# Patient Record
Sex: FEMALE | Race: ASIAN | NOT HISPANIC OR LATINO | URBAN - METROPOLITAN AREA
[De-identification: names, ages, dates, MRNs, and addresses within clinical notes are randomized per-mention and may not be internally consistent; named-entity substitution may affect disease eponyms.]

---

## 2018-11-08 ENCOUNTER — INPATIENT (INPATIENT)
Facility: HOSPITAL | Age: 53
LOS: 0 days | Discharge: HOME | End: 2018-11-09
Attending: INTERNAL MEDICINE | Admitting: INTERNAL MEDICINE
Payer: MEDICAID

## 2018-11-08 VITALS
SYSTOLIC BLOOD PRESSURE: 121 MMHG | HEIGHT: 64 IN | RESPIRATION RATE: 18 BRPM | WEIGHT: 132.5 LBS | DIASTOLIC BLOOD PRESSURE: 76 MMHG | TEMPERATURE: 98 F | HEART RATE: 65 BPM | OXYGEN SATURATION: 100 %

## 2018-11-08 DIAGNOSIS — Z90.49 ACQUIRED ABSENCE OF OTHER SPECIFIED PARTS OF DIGESTIVE TRACT: Chronic | ICD-10-CM

## 2018-11-08 DIAGNOSIS — Z98.890 OTHER SPECIFIED POSTPROCEDURAL STATES: Chronic | ICD-10-CM

## 2018-11-08 LAB
BUN SERPL-MCNC: 11 MG/DL — SIGNIFICANT CHANGE UP (ref 7–23)
CALCIUM SERPL-MCNC: 9.3 MG/DL — SIGNIFICANT CHANGE UP (ref 8.4–10.5)
CHLORIDE SERPL-SCNC: 105 MMOL/L — SIGNIFICANT CHANGE UP (ref 98–107)
CO2 SERPL-SCNC: 22 MMOL/L — SIGNIFICANT CHANGE UP (ref 22–31)
CREAT SERPL-MCNC: 0.56 MG/DL — SIGNIFICANT CHANGE UP (ref 0.5–1.3)
GLUCOSE SERPL-MCNC: 93 MG/DL — SIGNIFICANT CHANGE UP (ref 70–99)
HBA1C BLD-MCNC: 5.6 % — SIGNIFICANT CHANGE UP (ref 4–5.6)
HCG UR QL: NEGATIVE — SIGNIFICANT CHANGE UP
HCT VFR BLD CALC: 36.9 % — SIGNIFICANT CHANGE UP (ref 34.5–45)
HGB BLD-MCNC: 11.8 G/DL — SIGNIFICANT CHANGE UP (ref 11.5–15.5)
MCHC RBC-ENTMCNC: 28.6 PG — SIGNIFICANT CHANGE UP (ref 27–34)
MCHC RBC-ENTMCNC: 32 % — SIGNIFICANT CHANGE UP (ref 32–36)
MCV RBC AUTO: 89.6 FL — SIGNIFICANT CHANGE UP (ref 80–100)
NRBC # FLD: 0 — SIGNIFICANT CHANGE UP
PLATELET # BLD AUTO: 142 K/UL — LOW (ref 150–400)
PMV BLD: 12 FL — SIGNIFICANT CHANGE UP (ref 7–13)
POTASSIUM SERPL-MCNC: 3.6 MMOL/L — SIGNIFICANT CHANGE UP (ref 3.5–5.3)
POTASSIUM SERPL-MCNC: SIGNIFICANT CHANGE UP MMOL/L (ref 3.5–5.3)
POTASSIUM SERPL-SCNC: 3.6 MMOL/L — SIGNIFICANT CHANGE UP (ref 3.5–5.3)
POTASSIUM SERPL-SCNC: SIGNIFICANT CHANGE UP MMOL/L (ref 3.5–5.3)
RBC # BLD: 4.12 M/UL — SIGNIFICANT CHANGE UP (ref 3.8–5.2)
RBC # FLD: 12.1 % — SIGNIFICANT CHANGE UP (ref 10.3–14.5)
SODIUM SERPL-SCNC: 138 MMOL/L — SIGNIFICANT CHANGE UP (ref 135–145)
WBC # BLD: 5.02 K/UL — SIGNIFICANT CHANGE UP (ref 3.8–10.5)
WBC # FLD AUTO: 5.02 K/UL — SIGNIFICANT CHANGE UP (ref 3.8–10.5)

## 2018-11-08 PROCEDURE — 92928 PRQ TCAT PLMT NTRAC ST 1 LES: CPT | Mod: LD

## 2018-11-08 PROCEDURE — 93458 L HRT ARTERY/VENTRICLE ANGIO: CPT | Mod: 26,59

## 2018-11-08 PROCEDURE — 93010 ELECTROCARDIOGRAM REPORT: CPT

## 2018-11-08 RX ORDER — CLOPIDOGREL BISULFATE 75 MG/1
75 TABLET, FILM COATED ORAL DAILY
Qty: 0 | Refills: 0 | Status: DISCONTINUED | OUTPATIENT
Start: 2018-11-09 | End: 2018-11-09

## 2018-11-08 RX ORDER — ATORVASTATIN CALCIUM 80 MG/1
40 TABLET, FILM COATED ORAL AT BEDTIME
Qty: 0 | Refills: 0 | Status: DISCONTINUED | OUTPATIENT
Start: 2018-11-08 | End: 2018-11-09

## 2018-11-08 RX ORDER — METOPROLOL TARTRATE 50 MG
25 TABLET ORAL DAILY
Qty: 0 | Refills: 0 | Status: DISCONTINUED | OUTPATIENT
Start: 2018-11-08 | End: 2018-11-08

## 2018-11-08 RX ORDER — OMEPRAZOLE 10 MG/1
1 CAPSULE, DELAYED RELEASE ORAL
Qty: 0 | Refills: 0 | COMMUNITY

## 2018-11-08 RX ORDER — ASPIRIN/CALCIUM CARB/MAGNESIUM 324 MG
325 TABLET ORAL DAILY
Qty: 0 | Refills: 0 | Status: DISCONTINUED | OUTPATIENT
Start: 2018-11-09 | End: 2018-11-09

## 2018-11-08 RX ORDER — PANTOPRAZOLE SODIUM 20 MG/1
40 TABLET, DELAYED RELEASE ORAL
Qty: 0 | Refills: 0 | Status: DISCONTINUED | OUTPATIENT
Start: 2018-11-08 | End: 2018-11-09

## 2018-11-08 RX ORDER — SODIUM CHLORIDE 9 MG/ML
3 INJECTION INTRAMUSCULAR; INTRAVENOUS; SUBCUTANEOUS EVERY 8 HOURS
Qty: 0 | Refills: 0 | Status: DISCONTINUED | OUTPATIENT
Start: 2018-11-08 | End: 2018-11-09

## 2018-11-08 RX ORDER — TAMOXIFEN CITRATE 20 MG/1
20 TABLET, FILM COATED ORAL DAILY
Qty: 0 | Refills: 0 | Status: DISCONTINUED | OUTPATIENT
Start: 2018-11-08 | End: 2018-11-09

## 2018-11-08 RX ORDER — TAMOXIFEN CITRATE 20 MG/1
1 TABLET, FILM COATED ORAL
Qty: 0 | Refills: 0 | COMMUNITY

## 2018-11-08 RX ADMIN — SODIUM CHLORIDE 3 MILLILITER(S): 9 INJECTION INTRAMUSCULAR; INTRAVENOUS; SUBCUTANEOUS at 21:11

## 2018-11-08 RX ADMIN — ATORVASTATIN CALCIUM 40 MILLIGRAM(S): 80 TABLET, FILM COATED ORAL at 21:14

## 2018-11-08 NOTE — H&P CARDIOLOGY - HISTORY OF PRESENT ILLNESS
52 year old woman with PMHx of GERD, borderline HLD not on any meds, Breast CA s/p lumpectomy (01/2018) and radiation sent to [facility] from cardiologist office after having an abnormal stress test. Patient states that she has been having exertional CP with associated SOB and lightheadedness that has been persistent for approximately 2 weeks. She states that pain is upon exertion and improves with rest. She went to see her primary care doctor who believed she was having symptoms of GERD and started her on omeprazole. When she followed up with her cardiologist today he performed a stress test and told her that the results were abnormal and referred her coronary angiogram. Patient states that she has a strong family history of CAD her mother had CABG at age 36. Patient is sitting in bed denies any chest pain, SOB, HA, dizziness, lightheadedness, N/V/D/C, dysuria, changes in bowel or bladder habits or recent illness.

## 2018-11-08 NOTE — H&P CARDIOLOGY - RS GEN PE MLT RESP DETAILS PC
good air movement/clear to auscultation bilaterally/breath sounds equal/airway patent/respirations non-labored/normal

## 2018-11-08 NOTE — H&P CARDIOLOGY - PMH
Borderline hyperlipidemia    Breast cancer  s/p lumpectomy (01/18) and radiation therapy  GERD (gastroesophageal reflux disease)

## 2018-11-08 NOTE — CHART NOTE - NSCHARTNOTEFT_GEN_A_CORE
Rt femoral 6 Fr arterial sheath discontinued pressure applied for 30 mins and good hemostatis obtained. No hematoma or bleeding noted. Vital sign stable .Patient tolerated procedure well. RN instructed to monitor groin for bleeding or hematoma  .

## 2018-11-08 NOTE — H&P CARDIOLOGY - FAMILY HISTORY
Mother  Still living? Yes, Estimated age: Age Unknown  Family history of CABG, Age at diagnosis: Age Unknown  Family history of diabetes mellitus, Age at diagnosis: Age Unknown  Family history of stroke, Age at diagnosis: Age Unknown

## 2018-11-09 VITALS
SYSTOLIC BLOOD PRESSURE: 107 MMHG | RESPIRATION RATE: 18 BRPM | TEMPERATURE: 98 F | DIASTOLIC BLOOD PRESSURE: 71 MMHG | HEART RATE: 72 BPM | OXYGEN SATURATION: 99 %

## 2018-11-09 LAB
BUN SERPL-MCNC: 14 MG/DL — SIGNIFICANT CHANGE UP (ref 7–23)
CALCIUM SERPL-MCNC: 9.1 MG/DL — SIGNIFICANT CHANGE UP (ref 8.4–10.5)
CHLORIDE SERPL-SCNC: 106 MMOL/L — SIGNIFICANT CHANGE UP (ref 98–107)
CO2 SERPL-SCNC: 24 MMOL/L — SIGNIFICANT CHANGE UP (ref 22–31)
CREAT SERPL-MCNC: 0.64 MG/DL — SIGNIFICANT CHANGE UP (ref 0.5–1.3)
GLUCOSE SERPL-MCNC: 100 MG/DL — HIGH (ref 70–99)
HCT VFR BLD CALC: 37.3 % — SIGNIFICANT CHANGE UP (ref 34.5–45)
HGB BLD-MCNC: 11.8 G/DL — SIGNIFICANT CHANGE UP (ref 11.5–15.5)
MAGNESIUM SERPL-MCNC: 2.1 MG/DL — SIGNIFICANT CHANGE UP (ref 1.6–2.6)
MCHC RBC-ENTMCNC: 28.5 PG — SIGNIFICANT CHANGE UP (ref 27–34)
MCHC RBC-ENTMCNC: 31.6 % — LOW (ref 32–36)
MCV RBC AUTO: 90.1 FL — SIGNIFICANT CHANGE UP (ref 80–100)
NRBC # FLD: 0 — SIGNIFICANT CHANGE UP
PLATELET # BLD AUTO: 142 K/UL — LOW (ref 150–400)
PMV BLD: 11.3 FL — SIGNIFICANT CHANGE UP (ref 7–13)
POTASSIUM SERPL-MCNC: 4.6 MMOL/L — SIGNIFICANT CHANGE UP (ref 3.5–5.3)
POTASSIUM SERPL-SCNC: 4.6 MMOL/L — SIGNIFICANT CHANGE UP (ref 3.5–5.3)
RBC # BLD: 4.14 M/UL — SIGNIFICANT CHANGE UP (ref 3.8–5.2)
RBC # FLD: 12.2 % — SIGNIFICANT CHANGE UP (ref 10.3–14.5)
SODIUM SERPL-SCNC: 140 MMOL/L — SIGNIFICANT CHANGE UP (ref 135–145)
WBC # BLD: 5.07 K/UL — SIGNIFICANT CHANGE UP (ref 3.8–10.5)
WBC # FLD AUTO: 5.07 K/UL — SIGNIFICANT CHANGE UP (ref 3.8–10.5)

## 2018-11-09 RX ORDER — ASPIRIN/CALCIUM CARB/MAGNESIUM 324 MG
1 TABLET ORAL
Qty: 30 | Refills: 0 | OUTPATIENT
Start: 2018-11-09 | End: 2018-12-08

## 2018-11-09 RX ORDER — CLOPIDOGREL BISULFATE 75 MG/1
1 TABLET, FILM COATED ORAL
Qty: 0 | Refills: 0 | COMMUNITY
Start: 2018-11-09

## 2018-11-09 RX ORDER — ACETAMINOPHEN 500 MG
650 TABLET ORAL EVERY 6 HOURS
Qty: 0 | Refills: 0 | Status: DISCONTINUED | OUTPATIENT
Start: 2018-11-09 | End: 2018-11-09

## 2018-11-09 RX ORDER — CLOPIDOGREL BISULFATE 75 MG/1
1 TABLET, FILM COATED ORAL
Qty: 30 | Refills: 0 | OUTPATIENT
Start: 2018-11-09 | End: 2018-12-08

## 2018-11-09 RX ORDER — ATORVASTATIN CALCIUM 80 MG/1
1 TABLET, FILM COATED ORAL
Qty: 0 | Refills: 0 | COMMUNITY
Start: 2018-11-09

## 2018-11-09 RX ORDER — ATORVASTATIN CALCIUM 80 MG/1
1 TABLET, FILM COATED ORAL
Qty: 30 | Refills: 0 | OUTPATIENT
Start: 2018-11-09 | End: 2018-12-08

## 2018-11-09 RX ORDER — ASPIRIN/CALCIUM CARB/MAGNESIUM 324 MG
1 TABLET ORAL
Qty: 0 | Refills: 0 | COMMUNITY
Start: 2018-11-09

## 2018-11-09 RX ADMIN — PANTOPRAZOLE SODIUM 40 MILLIGRAM(S): 20 TABLET, DELAYED RELEASE ORAL at 05:50

## 2018-11-09 RX ADMIN — SODIUM CHLORIDE 3 MILLILITER(S): 9 INJECTION INTRAMUSCULAR; INTRAVENOUS; SUBCUTANEOUS at 05:50

## 2018-11-09 RX ADMIN — Medication 325 MILLIGRAM(S): at 12:55

## 2018-11-09 RX ADMIN — CLOPIDOGREL BISULFATE 75 MILLIGRAM(S): 75 TABLET, FILM COATED ORAL at 12:55

## 2018-11-09 RX ADMIN — SODIUM CHLORIDE 3 MILLILITER(S): 9 INJECTION INTRAMUSCULAR; INTRAVENOUS; SUBCUTANEOUS at 13:16

## 2018-11-09 RX ADMIN — TAMOXIFEN CITRATE 20 MILLIGRAM(S): 20 TABLET, FILM COATED ORAL at 12:55

## 2018-11-09 NOTE — DISCHARGE NOTE ADULT - PATIENT PORTAL LINK FT
You can access the [facility] Patient Portal, offered by [facility], by registering with the following website: [email or web address]

## 2018-11-09 NOTE — DISCHARGE NOTE ADULT - CARE PLAN
Principal Discharge DX:	CAD (coronary artery disease)  Goal:	To be asymptomatic, to reduce risks factors such as hypertension, diabetes and hyperlipidemia to lower the risk of blood clots formation; and to prevent complications of coronary artery disease such as worsening chest pain, heart attack and death.  Assessment and plan of treatment:	Continue aspirin and Plavix, do not stop unless instructed by your physician.  Continue low salt, fat, cholesterol and carbohydrate diet. Follow up with cardiologist and primary care physician's routine appointment. Principal Discharge DX:	CAD (coronary artery disease)  Goal:	To be asymptomatic, to reduce risks factors such as hypertension, diabetes and hyperlipidemia to lower the risk of blood clots formation; and to prevent complications of coronary artery disease such as worsening chest pain, heart attack and death.  Assessment and plan of treatment:	Continue aspirin and Plavix, do not stop unless instructed by your physician.  Continue low salt, fat, cholesterol and carbohydrate diet. Follow up with cardiologist and primary care physician's routine appointment.  Secondary Diagnosis:	Breast cancer  Goal:	Continue Tamoxifen  Assessment and plan of treatment:	Follow up with your primary care doctor  Secondary Diagnosis:	GERD (gastroesophageal reflux disease)  Goal:	To prevent acid reflux, heartburn and chest pain.  Assessment and plan of treatment:	Avoid fatty, fried foods, acidic foods such as tomatoes, lime and chocolate. Continue to take your medications as prescribed, exercise daily and weight loss.  Secondary Diagnosis:	Hyperlipidemia  Goal:	To maintain normal cholesterol levels to prevent stroke, coronary artery disease, peripheral vascular disease and heart attacks.  Assessment and plan of treatment:	Low fat diet, exercise daily and continue current medications. Follow up with primary care physician and cardiologist for management.

## 2018-11-09 NOTE — DISCHARGE NOTE ADULT - PLAN OF CARE
To be asymptomatic, to reduce risks factors such as hypertension, diabetes and hyperlipidemia to lower the risk of blood clots formation; and to prevent complications of coronary artery disease such as worsening chest pain, heart attack and death. Continue aspirin and Plavix, do not stop unless instructed by your physician.  Continue low salt, fat, cholesterol and carbohydrate diet. Follow up with cardiologist and primary care physician's routine appointment. Continue Tamoxifen Follow up with your primary care doctor To prevent acid reflux, heartburn and chest pain. Avoid fatty, fried foods, acidic foods such as tomatoes, lime and chocolate. Continue to take your medications as prescribed, exercise daily and weight loss. To maintain normal cholesterol levels to prevent stroke, coronary artery disease, peripheral vascular disease and heart attacks. Low fat diet, exercise daily and continue current medications. Follow up with primary care physician and cardiologist for management.

## 2018-11-09 NOTE — DISCHARGE NOTE ADULT - PROVIDER TOKENS
PILI:'[id number]' TOKEN:'[id number]',FREE:[LAST:[Dr. Ivory Deutsch],PHONE:[phone]],FAX:[(   )    -],ADDRESS:[Internist  [address] [place] [address]]],TOKEN:'[id number]'

## 2018-11-09 NOTE — PROGRESS NOTE ADULT - ASSESSMENT
52 year old woman with PMHx of GERD, borderline HLD not on any meds, Breast CA s/p lumpectomy (01/2018) and radiation sent to [facility] from cardiologist office after having an abnormal stress test s/p PCI mLAD.    CAD s/p PCI mLAD  - ASA 324mg daily  - Plavix 75mg daily  - Atoravastatin 40mg qhs  - Follow-up with primary cardiology Dr. Pritesh Rocha in 1-2 weeks    Tyree Ramos M.D.  Cardiology Fellow

## 2018-11-09 NOTE — DISCHARGE NOTE ADULT - CARE PROVIDER_API CALL
Isaac Jha), Cardiovascular Disease; Internal Medicine; Interventional Cardiology  [address] [address] [place] [address]  [place] [address]  Phone: [phone]  Fax: [phone] Isaac Jha), Cardiovascular Disease; Internal Medicine; Interventional Cardiology  [address] [address] [place] [address]  [address] [place] [address]  Phone: [phone]  Fax: [phone]    Dr. Ivory Deutsch,   Internist  [address] [place] [address]  Phone: [phone]  Fax: (   )    -    Pritesh Montenegro), Cardiovascular Disease; Internal Medicine  Phone: [phone]  Fax: [phone]

## 2018-11-09 NOTE — PROGRESS NOTE ADULT - SUBJECTIVE AND OBJECTIVE BOX
CARDIOLOGY PROGRESS NOTE    Interval Events:   Patient seen and examined. s/p [facility]. Denies chest pain, palpitations, SOB.    Review Of Systems:   All other review of systems negative unless indicated above.         Medications:  acetaminophen   Tablet .. 650 milliGRAM(s) Oral every 6 hours PRN  aspirin enteric coated 325 milliGRAM(s) Oral daily  atorvastatin 40 milliGRAM(s) Oral at bedtime  clopidogrel Tablet 75 milliGRAM(s) Oral daily  pantoprazole    Tablet 40 milliGRAM(s) Oral before breakfast  sodium chloride 0.9% lock flush 3 milliLiter(s) IV Push every 8 hours  tamoxifen 20 milliGRAM(s) Oral daily    PAST MEDICAL & SURGICAL HISTORY:  GERD (gastroesophageal reflux disease)  Breast cancer: s/p lumpectomy (01/18) and radiation therapy  Borderline hyperlipidemia  History of appendectomy  History of lumpectomy      Vitals:  T(F): 97.8 (11-09), Max: 98 (11-08)  HR: 67 (11-09) (55 - 67)  BP: 105/72 (11-09) (96/65 - 121/76)  RR: 18 (11-09)  SpO2: 99% (11-09)  I&O's Summary      Physical Exam:  Appearance: No acute distress; well appearing  Eyes: PERRL, EOMI, pink conjunctiva  HEENT: Normal oral mucosa  Cardiovascular: RRR, S1, S2, no murmurs, rubs, or gallops; no edema; no JVD  Respiratory: Clear to auscultation bilaterally  Gastrointestinal: soft, non-tender, non-distended with normal bowel sounds  Musculoskeletal: No clubbing; no joint deformity   Neurologic: Non-focal  Psychiatry: AAOx3, mood & affect appropriate  Skin: No rashes, ecchymoses, or cyanosis    Labs:                        11.8   5.07  )-----------( 142      ( 09 Nov 2018 07:50 )             37.3     11-09    140  |  106  |  14  ----------------------------<  100<H>  4.6   |  24  |  0.64    Ca    9.1      09 Nov 2018 07:50  Mg     2.1     11-09                Hemoglobin A1C, Whole Blood: 5.6 % (11-08 @ 15:00)      New ECG(s): Personally reviewed    Echo:    Stress Testing:     Cath:  < from: Cardiac Cath Lab - Adult (11.08.18 @ 17:15) >  VENTRICLES: Global left ventricular function was normal. EF estimated was  55 %.  CORONARY VESSELS: The coronary circulation is right dominant.  LM:   --  LM: Normal.  LAD:   --  Mid LAD: There was a discrete 90 % stenosis in the proximal  third of the vessel segment. There was DAVID grade 3 flow through the  vessel (brisk flow).  CX:   --  Circumflex: Normal.  RCA:   --  Proximal RCA: There was a discrete 20 % stenosis.  COMPLICATIONS: There were no complications.  SUMMARY:  1ST LESION INTERVENTIONS: A successful balloon angioplasty with stent was  performed on the 90 % lesion in the mid LAD. Following intervention there  was a 1 % residual stenosis.  DIAGNOSTIC RECOMMENDATIONS: PCI of mid LAD for treatment of angina.  INTERVENTIONAL RECOMMENDATIONS: Add clopidogrel (Plavix), 75 mg, PO, daily.  Add aspirin, 325 mg, PO, daily. Patient management should include    Imaging:

## 2018-11-09 NOTE — DISCHARGE NOTE ADULT - FINDINGS/TREATMENT
VENTRICLES: Global left ventricular function was normal. EF estimated was  55 %.  CORONARY VESSELS: The coronary circulation is right dominant.  LM:   --  LM: Normal.  LAD:   --  Mid LAD: There was a discrete 90 % stenosis in the proximal  third of the vessel segment. There was DAVID grade 3 flow through the  vessel (brisk flow).  CX:   --  Circumflex: Normal.  RCA:   --  Proximal RCA: There was a discrete 20 % stenosis.

## 2018-11-09 NOTE — PROGRESS NOTE ADULT - PROVIDER SPECIALTY LIST ADULT
Cardiology Patient needs refill on     Droxidopa 100 MG Cap    [facility] [phone] option 2 then option 4    FAX: [phone]

## 2018-11-09 NOTE — DISCHARGE NOTE ADULT - MEDICATION SUMMARY - MEDICATIONS TO TAKE
I will START or STAY ON the medications listed below when I get home from the hospital:    aspirin 325 mg oral delayed release tablet  -- 1 tab(s) by mouth once a day  -- Indication: For CAD (coronary artery disease)    atorvastatin 40 mg oral tablet  -- 1 tab(s) by mouth once a day (at bedtime)  -- Indication: For Hyperlipidemia    tamoxifen 20 mg oral tablet  -- 1 tab(s) by mouth once a day  -- Indication: For Breast cancer    clopidogrel 75 mg oral tablet  -- 1 tab(s) by mouth once a day  -- Indication: For CAD (coronary artery disease)    omeprazole 20 mg oral delayed release capsule  -- 1 cap(s) by mouth once a day  -- Indication: For GERD (gastroesophageal reflux disease) I will START or STAY ON the medications listed below when I get home from the hospital:    aspirin 325 mg oral delayed release tablet  -- 1 tab(s) by mouth once a day  -- Indication: For CAD (coronary artery disease)    atorvastatin 40 mg oral tablet  -- 1 tab(s) by mouth once a day (at bedtime)  -- Indication: For CAD (coronary artery disease)    tamoxifen 20 mg oral tablet  -- 1 tab(s) by mouth once a day  -- Indication: For Breast cancer    clopidogrel 75 mg oral tablet  -- 1 tab(s) by mouth once a day  -- Indication: For CAD (coronary artery disease)    omeprazole 20 mg oral delayed release capsule  -- 1 cap(s) by mouth once a day  -- Indication: For GERD (gastroesophageal reflux disease)

## 2018-11-09 NOTE — DISCHARGE NOTE ADULT - CARE PROVIDERS DIRECT ADDRESSES
[email or web address],DirectAddress_Unknown,DirectAddress_Unknown

## 2018-11-09 NOTE — DISCHARGE NOTE ADULT - HOSPITAL COURSE
52 year old woman with PMHx of GERD, borderline HLD not on any meds, Breast CA s/p lumpectomy (01/2018) and radiation sent to [facility] from cardiologist office after having an abnormal stress test. Patient states that she has been having exertional CP with associated SOB and lightheadedness that has been persistent for approximately 2 weeks. She states that pain is upon exertion and improves with rest. She went to see her primary care doctor who believed she was having symptoms of GERD and started her on omeprazole. When she followed up with her cardiologist today he performed a stress test and told her that the results were abnormal and referred her coronary angiogram. Patient states that she has a strong family history of CAD her mother had CABG at age 36. Patient is sitting in bed denies any chest pain, SOB, HA, dizziness, lightheadedness, N/V/D/C, dysuria, changes in bowel or bladder habits or recent illness.    Patient had a cardiac catheterization that resulted  in a successful balloon angioplasty with stent was performed on the 90 % lesion in the mid LAD for treatment of angina.  Recommendations made to add Plavix 75 mg PO daily, add aspirin 325 mg PO daily. Patient management should include aggressive medical therapy.    < end of copied text > 53 y/o female, with PmHx of GERD, HLD, Breast CA s/p lumpectomy (01/2018) and radiation sent to [facility] from cardiologist office after having an abnormal stress test. Patient states that she has been having exertional CP with associated SOB and lightheadedness that has been persistent for approximately 2 weeks. She states that the pain is upon exertion and improves with rest. She went to see her primary care doctor who believed she was having symptoms of GERD and started her on omeprazole. When she followed up with her cardiologist today he performed a stress test and told her that the results were abnormal and referred her coronary angiogram. Patient states that she has a strong family history of CAD her mother had CABG at age 36. Patient is sitting in bed denies any chest pain, SOB, HA, dizziness, lightheadedness, N/V/D/C, dysuria, changes in bowel or bladder habits or recent illness. She was [facility] a cardiac cath.    Patient had a cardiac catheterization that resulted  in a successful balloon angioplasty and a stent was placed to the 90 % lesion in the mid LAD for treatment of angina.  Recommendations made to add Plavix 75 mg PO daily, add aspirin 325 mg PO daily. Patient management should include aggressive medical therapy.    Pt comfortable at this time and is now medically cleared for discharge home. Outpatient follow up.

## 2022-09-08 NOTE — PATIENT PROFILE ADULT - EQUIPMENT CURRENTLY USED AT HOME
Call to patient and Aunt. Patient denies any complaints related to anticoagulation therapy at this time. Patient reports no change in medication, diet or health. Reinforced with patient to call clinic with any medication changes as this can impact INR. Reinforced signs and symptoms bleeding/clotting with patient. Patient aware to seek medical care if signs and symptoms develop. Advised that if patient falls and/or hits their head, they should seek medical attention. Verbalizes understanding.     Dosing instructions given to patient and Aunt verbally over the phone. Advised to call the clinic with any questions or concerns. Patient verbalizes understanding. Has clinic number.    Anticoagulation Summary  As of 2022    INR goal:  1.5-2.0   TTR:  51.7 % (5.8 mo)   INR used for dosin.1 (2022)   Warfarin maintenance plan:  6 mg (2 mg x 3) every M, W, F; 4 mg (2 mg x 2) all other days   Weekly warfarin total:  34 mg   Plan last modified:  Ayla Juárez RN (7/15/2022)   Next INR check:  2022   Priority:  Follow-Up - 2 Weeks   Target end date:      Indications    S/P AVR (aortic valve replacement) - mechanical [Z95.2]  Long term (current) use of anticoagulants [Z79.01]  Encounter for therapeutic drug monitoring [Z51.81]             Anticoagulation Episode Summary     INR check location:      Preferred lab:      Send INR reminders to:  APRIL (OPEN ENROLLMENT) ACS CARD/EP    Comments:  New Stac 22 Range 1.5-2.0. On-X [facility] Aortic Valve. ACL/SLM; 2 mg;  speak in TABS; call after 1:30 pm so significant other can listen in      Anticoagulation Care Providers     Provider Role Specialty Phone number    Deep Emmanuel MD Responsible Internal Medicine- Interventional Cardiology [phone]          Supervising provider: Micah Harper MD       no